# Patient Record
Sex: MALE | Race: ASIAN | NOT HISPANIC OR LATINO | Employment: FULL TIME | URBAN - NONMETROPOLITAN AREA
[De-identification: names, ages, dates, MRNs, and addresses within clinical notes are randomized per-mention and may not be internally consistent; named-entity substitution may affect disease eponyms.]

---

## 2020-01-04 ENCOUNTER — OFFICE VISIT (OUTPATIENT)
Dept: URGENT CARE | Facility: PHYSICIAN GROUP | Age: 45
End: 2020-01-04
Payer: COMMERCIAL

## 2020-01-04 ENCOUNTER — OCCUPATIONAL MEDICINE (OUTPATIENT)
Dept: URGENT CARE | Facility: PHYSICIAN GROUP | Age: 45
End: 2020-01-04
Payer: COMMERCIAL

## 2020-01-04 VITALS
WEIGHT: 202 LBS | SYSTOLIC BLOOD PRESSURE: 120 MMHG | HEART RATE: 74 BPM | DIASTOLIC BLOOD PRESSURE: 82 MMHG | RESPIRATION RATE: 16 BRPM | HEIGHT: 72 IN | TEMPERATURE: 98.2 F | BODY MASS INDEX: 27.36 KG/M2 | OXYGEN SATURATION: 97 %

## 2020-01-04 DIAGNOSIS — L60.1 NAIL PLATE SEPARATION: ICD-10-CM

## 2020-01-04 DIAGNOSIS — Z00.00 VISIT FOR ANNUAL HEALTH EXAMINATION: ICD-10-CM

## 2020-01-04 PROCEDURE — 99213 OFFICE O/P EST LOW 20 MIN: CPT | Performed by: FAMILY MEDICINE

## 2020-01-04 NOTE — PROGRESS NOTES
Subjective:      Soco Swan is a 44 y.o. male who presents with No chief complaint on file.      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         Objective:     There were no vitals taken for this visit.     Physical Exam  Constitutional:       Appearance: Normal appearance.   HENT:      Head: Normocephalic and atraumatic.      Nose: Nose normal.      Mouth/Throat:      Mouth: Mucous membranes are moist.   Eyes:      Pupils: Pupils are equal, round, and reactive to light.   Neck:      Musculoskeletal: Normal range of motion.   Cardiovascular:      Pulses: Normal pulses.   Pulmonary:      Effort: Pulmonary effort is normal.   Musculoskeletal:        Hands:    Neurological:      Mental Status: He is alert.         Nail still attached to the index finger, there is a small separation in the front, there is no bleeding or discharge, patient is able to move the fingers, and there is no neurovascular issues       Assessment/Plan:     1. Nail plate separation       -I will have the patient wrapped the nail with a nonstick gauze, patient can go back to normal job,   If there is any issue including fever pain discharge to come back to the clinic

## 2020-01-04 NOTE — LETTER
[address] [place] [address]  Phone:  [phone] - Fax:  [phone]   [facility] Progress Report and Disability Certification  Date of Service: 1/4/2020   No Show:  No  Date / Time of Next Visit:     Claim Information   Patient Name: Soco Swan  Claim Number:     Employer: [employer]  Date of Injury: 1/4/2020     Insurer / TPA: [employer]  ID / SSN:     Occupation: Maintenance  Diagnosis: The encounter diagnosis was Nail plate separation.    Medical Information   Related to Industrial Injury? No    Subjective Complaints:  Patient is a 44-year-old male is here complaining of right index finger nail that was lifted after he was fixing a glue machine.patient is stated that he had some bleeding, however the nail was not  from the finger, pain is minimal   Objective Findings: Nail still attached to the index finger, there is a small separation in the front, there is no bleeding or discharge, patient is able to move the fingers, and there is no neurovascular issues   Pre-Existing Condition(s): None   Assessment:   Initial Visit    Status:    Permanent Disability:No    Plan:   Comments:Patient can come back to the normal activity    Diagnostics: Other (see comment)    Comments:       Disability Information   Status: Released to Full Duty    From:     Through:   Restrictions are:     Physical Restrictions   Sitting:    Standing:    Stooping:    Bending:      Squatting:    Walking:    Climbing:    Pushing:      Pulling:    Other:    Reaching Above Shoulder (L):   Reaching Above Shoulder (R):       Reaching Below Shoulder (L):    Reaching Below Shoulder (R):      Not to exceed Weight Limits   Carrying(hrs):   Weight Limit(lb):   Lifting(hrs):   Weight  Limit(lb):     Comments: Patient is able to go back to the normal activity    Repetitive Actions   Hands: i.e. Fine Manipulations from Grasping:     Feet: i.e. Operating Foot  Controls:     Driving / Operate Machinery:     Physician Name: Angel Gomez M.D. Physician Signature:   e-Signature: Dr. Dong Sanchez, Medical Director   Clinic Name / Location: [address] [address] Clinic Phone Number: Dept: [phone]   Appointment Time: 3:25 Pm Visit Start Time: 3:26 PM   Check-In Time:  3:23 Pm Visit Discharge Time:  3:40 PM   Original-Treating Physician or Chiropractor    Page 2-Insurer/TPA    Page 3-Employer    Page 4-Employee

## 2020-01-04 NOTE — PATIENT INSTRUCTIONS
Finger Sprain  A finger sprain is an injury to one of the strong bands of tissue (ligaments) that connect the bones in the finger. The ligament can be stretched too much, or it can tear. A tear can be either partial or complete. The severity of the sprain depends on how much of the ligament was damaged or torn.  CAUSES  This injury is often caused by a fall or an accident. For example, if you extend your hands to catch an object or to protect yourself during a fall, the force of impact may cause the ligaments in your finger to stretch too much.  RISK FACTORS  The following factors may make you more likely to have this injury:  · Playing sports that involve a greater risk of falling, such as skiing.  · Playing sports that involve catching an object, such as basketball.  · Having poor strength and flexibility.  SYMPTOMS  Symptoms of this condition include:  · Pain at the affected finger joint, especially when bending or extending the finger.  · Loss of motion in the finger.  · Swelling.  · Tenderness.  · Bruising.  DIAGNOSIS  This condition is diagnosed with a medical history and physical exam. You may also have an X-ray of your finger to rule out a fracture or dislocation.  TREATMENT  Treatment varies depending on the severity of the sprain. If your ligament is overstretched or partially torn, treatment usually involves:  · Keeping the finger in a fixed position (immobilization) for a period of time. To help you do this, your health care provider may apply a bandage, splint, or cast to keep the finger from moving until it heals. In some cases, the finger may be taped to the fingers beside it (jamaal taping).  · Taking medicines for pain.  · Doing exercises for the finger after it has begun to heal.  If your ligament is fully torn, you may need surgery to reconnect the ligament to the bone. After surgery, a cast or splint will be applied.  HOME CARE INSTRUCTIONS  If You Have a Splint:   · Wear the splint as told by  your health care provider. Remove it only as told by your health care provider.  · Loosen the splint if your fingers tingle, become numb, or turn cold and blue.  · Do not let your splint get wet if it is not waterproof.  · Keep the splint clean.  If You Have a Cast:   · Do not stick anything inside the cast to scratch your skin. Doing that increases your risk of infection.  · Check the skin around the cast every day. Tell your health care provider about any concerns.  · You may put lotion on dry skin around the edges of the cast. Do not put lotion on the skin underneath the cast.  · Do not let your cast get wet if it is not waterproof.  · Keep the cast clean.  Bathing   · If your splint or cast is not waterproof, cover it with a watertight plastic bag when you take a bath or a shower.  · Keep any bandages (dressings) dry until your health care provider says they can be removed.  Managing Pain, Stiffness, and Swelling   · If directed, put ice on the injured area:  ¨ Put ice in a plastic bag.  ¨ Place a towel between your skin and the bag.  ¨ Leave the ice on for 20 minutes, 2-3 times a day.  · Move your fingers often to avoid stiffness and to lessen swelling.  · Raise (elevate) the injured area above the level of your heart while you are sitting or lying down.  General Instructions   · Do not put pressure on any part of the cast or splint until it is fully hardened. This may take several hours.  · Take over-the-counter and prescription medicines only as told by your health care provider.  · Do not drive or operate heavy machinery while taking prescription pain medicine.  · Do exercises as told by your health care provider or physical therapist.  · Do not wear rings on your injured finger.  · Keep all follow-up visits as told by your health care provider. This is important.  SEEK MEDICAL CARE IF:  · Your pain is not controlled with medicine.  · Your bruising or swelling gets worse.  · Your cast or splint is  damaged.  · Your finger is numb or blue.  · Your finger feels colder than normal.  This information is not intended to replace advice given to you by your health care provider. Make sure you discuss any questions you have with your health care provider.  Document Released: 01/25/2006 Document Revised: 04/10/2017 Document Reviewed: 10/27/2016  [employer] Interactive Patient Education © 2017 [employer]

## 2020-01-04 NOTE — LETTER
EMPLOYEE’S CLAIM FOR COMPENSATION/ REPORT OF INITIAL TREATMENT  FORM C-4    EMPLOYEE’S CLAIM - PROVIDE ALL INFORMATION REQUESTED   First Name  Soco Last Name  Sosa Birthdate                    1975                Sex  male Claim Number   Home Address  [address] Age  44 y.o. 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    How did this injury or occupational disease occur? (Be specific an answer in detail. Use additional sheet if necessary)  A tool i was using was so sharp   If you believe that you have an occupational disease, when did you first have knowledge of the disability and it relationship to your employment?  N/A Witnesses to the Accident  Moni Joseph      Nature of Injury or Occupational Disease  Puncture  Part(s) of Body Injured or Affected  Finger (R), ,     I certify that the above is true and correct to the best of my knowledge and that I have provided this information in order to obtain the benefits of [place]s [place] inclusive or Chapter  617 of [facility]).  I hereby authorize any physician, chiropractor, surgeon, practitioner, or other person, any hospital, including [facility] or [facility] hospital, any medical service organization, any insurance company, or other institution or organization to release to each other, any medical or other information, including benefits paid or payable, pertinent to this injury or disease, except information relative to diagnosis, treatment and/or counseling for AIDS, psychological conditions, alcohol or controlled substances, for which I must give specific authorization.  A Photostat of this authorization shall be as valid as the original.     Date: 1/4/2020   Place: [facility] [place] [facility]   Employee’s Signature   THIS REPORT MUST BE COMPLETED AND MAILED WITHIN 3 WORKING DAYS OF TREATMENT   Place  [facility]  Name of Facility  [facility]   Date  1/4/2020 Diagnosis  (L60.1) Nail plate separation Is there evidence the injured employee was under the influence of alcohol and/or another controlled substance at the time of accident?   Hour  3:26 PM Description of Injury or Disease  The encounter diagnosis was Nail plate separation. No   Treatment  We will have the finger wrapped with a nonstick gauze.   Have you advised the patient to remain off work five days or more? No   X-Ray Findings      If Yes   From Date  To Date      From information given by the employee, together with medical evidence, can you directly connect this injury or occupational disease as job incurred?  Yes If No Full Duty Yes Modified Duty  No   Is additional medical care by a physician indicated?  No If Modified Duty, Specify any Limitations / Restrictions      Do you know of any previous injury or disease contributing to this condition or occupational disease?                            No   Date  1/4/2020 Print Doctor’s Name Angel Gomez M.D. I certify the employer’s copy of  this form was mailed on:   Address  [address]  [address] Insurer’s Use Only     [place] Zip  [address]    Provider’s Tax ID Number  [id number] Telephone  Dept: [phone]            e-Signature: Dr. Dong Sanchez,   Medical Director Degree  MD        ORIGINAL-TREATING PHYSICIAN OR CHIROPRACTOR    PAGE 2-INSURER/TPA    PAGE 3-EMPLOYER    PAGE 4-EMPLOYEE             Form C-4 (rev.10/07)              BRIEF DESCRIPTION OF RIGHTS AND BENEFITS  (Pursuant to [place])    Notice of Injury or Occupational Disease (Incident Report Form C-1): If an injury or occupational disease (OD) arises out of and in the course of employment, you must provide written notice to your employer as soon as practicable, but no later than 7 days after the accident or OD. Your employer shall maintain a sufficient supply of the required forms.    Claim for Compensation (Form C-4): If medical treatment is sought, the form C-4 is available at the place of initial treatment. A completed \"Claim for Compensation\" (Form C-4) must be filed within 90 days after an accident or OD. The treating physician or chiropractor must, within 3 working days after treatment, complete and mail to the employer, the employer's insurer and third-party , the Claim for Compensation.    Medical Treatment: If you require medical treatment for your on-the-job injury or OD, you may be required to select a physician or chiropractor from a list provided by your workers’ compensation insurer, if it has contracted with an Organization for Managed Care (MCO) or Preferred Provider Organization (PPO) or providers of health care. If your employer has not entered into a contract with an MCO or PPO, you may select a physician or chiropractor from the Panel of Physicians and Chiropractors. Any medical costs related to your industrial injury or OD will be paid by your insurer.    Temporary Total Disability (TTD): If your doctor has certified that you are unable to work for a period of at least 5 " consecutive days, or 5 cumulative days in a 20-day period, or places restrictions on you that your employer does not accommodate, you may be entitled to TTD compensation.    Temporary Partial Disability (TPD): If the wage you receive upon reemployment is less than the compensation for TTD to which you are entitled, the insurer may be required to pay you TPD compensation to make up the difference. TPD can only be paid for a maximum of 24 months.    Permanent Partial Disability (PPD): When your medical condition is stable and there is an indication of a PPD as a result of your injury or OD, within 30 days, your insurer must arrange for an evaluation by a rating physician or chiropractor to determine the degree of your PPD. The amount of your PPD award depends on the date of injury, the results of the PPD evaluation and your age and wage.    Permanent Total Disability (PTD): If you are medically certified by a treating physician or chiropractor as permanently and totally disabled and have been granted a PTD status by your insurer, you are entitled to receive monthly benefits not to exceed 66 2/3% of your average monthly wage. The amount of your PTD payments is subject to reduction if you previously received a PPD award.    Vocational Rehabilitation Services: You may be eligible for vocational rehabilitation services if you are unable to return to the job due to a permanent physical impairment or permanent restrictions as a result of your injury or occupational disease.    Transportation and Per Jassi Reimbursement: You may be eligible for travel expenses and per jassi associated with medical treatment.    Reopening: You may be able to reopen your claim if your condition worsens after claim closure.    Appeal Process: If you disagree with a written determination issued by the insurer or the insurer does not respond to your request, you may appeal to the Department of Administration, , by following the  instructions contained in your determination letter. You must appeal the determination within 70 days from the date of the determination letter at [address], [address], [place], [place] [address], or [address], [address], [place], [place] [address]. If you disagree with the  decision, you may appeal to the Department of Administration, . You must file your appeal within 30 days from the date of the  decision letter at [address], [address], [place], [place] [address], or [address], [address], [place], [place] [address]. If you disagree with a decision of an , you may file a petition for judicial review with the District Court. You must do so within 30 days of the Appeal Officer’s decision. You may be represented by an  at your own expense or you may contact the [facility] for possible representation.    [place]  for Injured Workers (NAIW): If you disagree with a  decision, you may request that NAIW represent you without charge at an  Hearing. For information regarding denial of benefits, you may contact the [facility] at: [address], [address], [place] [address], [phone], or [address], [address], [place] [address], [phone]    To File a Complaint with the Division: If you wish to file a complaint with the  of the Division of Industrial Relations (DIR),  please contact the Workers’ Compensation Section, [address], [address], [place], [place] [address], telephone [phone]) [phone], or [address], [address], [place], [place] [address], telephone [phone].    For assistance with Workers’ Compensation Issues: You may contact the Office of the Governor Consumer Health Assistance, [address], [address], [place], [place] [address], Toll Free 1-655.666.5584, Web site: http://govProBinder.[facility].[place]., E-mail  joan@sheyla..[place].us                   __________________________________________________________________                                                     ___1/4/2020______        Employee Name / Signature                                                                                                                                              Date                                                                                                                                                                                                     D-2 (rev. 06/18)

## 2020-01-07 ENCOUNTER — HOSPITAL ENCOUNTER (EMERGENCY)
Facility: MEDICAL CENTER | Age: 45
End: 2020-01-07
Attending: EMERGENCY MEDICINE
Payer: COMMERCIAL

## 2020-01-07 ENCOUNTER — NON-PROVIDER VISIT (OUTPATIENT)
Dept: OCCUPATIONAL MEDICINE | Facility: CLINIC | Age: 45
End: 2020-01-07
Payer: COMMERCIAL

## 2020-01-07 ENCOUNTER — APPOINTMENT (OUTPATIENT)
Dept: RADIOLOGY | Facility: MEDICAL CENTER | Age: 45
End: 2020-01-07
Attending: STUDENT IN AN ORGANIZED HEALTH CARE EDUCATION/TRAINING PROGRAM
Payer: COMMERCIAL

## 2020-01-07 VITALS
OXYGEN SATURATION: 97 % | WEIGHT: 201.28 LBS | TEMPERATURE: 97.5 F | HEART RATE: 75 BPM | DIASTOLIC BLOOD PRESSURE: 96 MMHG | HEIGHT: 70 IN | BODY MASS INDEX: 28.82 KG/M2 | RESPIRATION RATE: 16 BRPM | SYSTOLIC BLOOD PRESSURE: 150 MMHG

## 2020-01-07 DIAGNOSIS — Z02.83 ENCOUNTER FOR DRUG SCREENING: ICD-10-CM

## 2020-01-07 DIAGNOSIS — S01.81XA FACIAL LACERATION, INITIAL ENCOUNTER: ICD-10-CM

## 2020-01-07 LAB
AMP AMPHETAMINE: NORMAL
BAR BARBITURATES: NORMAL
BREATH ALCOHOL COMMENT: NORMAL
BZO BENZODIAZEPINES: NORMAL
COC COCAINE: NORMAL
INT CON NEG: NORMAL
INT CON POS: NORMAL
MDMA ECSTASY: NORMAL
MET METHAMPHETAMINES: NORMAL
MTD METHADONE: NORMAL
OPI OPIATES: NORMAL
OXY OXYCODONE: NORMAL
PCP PHENCYCLIDINE: NORMAL
POC BREATHALIZER: 0 PERCENT (ref 0–0.01)
POC URINE DRUG SCREEN OCDRS: NORMAL
THC: NORMAL

## 2020-01-07 PROCEDURE — 82075 ASSAY OF BREATH ETHANOL: CPT | Performed by: PREVENTIVE MEDICINE

## 2020-01-07 PROCEDURE — 304217 HCHG IRRIGATION SYSTEM

## 2020-01-07 PROCEDURE — 304999 HCHG REPAIR-SIMPLE/INTERMED LEVEL 1

## 2020-01-07 PROCEDURE — 70486 CT MAXILLOFACIAL W/O DYE: CPT

## 2020-01-07 PROCEDURE — 303747 HCHG EXTRA SUTURE

## 2020-01-07 PROCEDURE — 700111 HCHG RX REV CODE 636 W/ 250 OVERRIDE (IP): Performed by: STUDENT IN AN ORGANIZED HEALTH CARE EDUCATION/TRAINING PROGRAM

## 2020-01-07 PROCEDURE — 90715 TDAP VACCINE 7 YRS/> IM: CPT | Performed by: STUDENT IN AN ORGANIZED HEALTH CARE EDUCATION/TRAINING PROGRAM

## 2020-01-07 PROCEDURE — 80305 DRUG TEST PRSMV DIR OPT OBS: CPT | Performed by: PREVENTIVE MEDICINE

## 2020-01-07 PROCEDURE — 700101 HCHG RX REV CODE 250: Performed by: STUDENT IN AN ORGANIZED HEALTH CARE EDUCATION/TRAINING PROGRAM

## 2020-01-07 PROCEDURE — 90471 IMMUNIZATION ADMIN: CPT

## 2020-01-07 PROCEDURE — 99283 EMERGENCY DEPT VISIT LOW MDM: CPT

## 2020-01-07 RX ORDER — LIDOCAINE HYDROCHLORIDE AND EPINEPHRINE 10; 10 MG/ML; UG/ML
5 INJECTION, SOLUTION INFILTRATION; PERINEURAL ONCE
Status: COMPLETED | OUTPATIENT
Start: 2020-01-07 | End: 2020-01-07

## 2020-01-07 RX ADMIN — CLOSTRIDIUM TETANI TOXOID ANTIGEN (FORMALDEHYDE INACTIVATED), CORYNEBACTERIUM DIPHTHERIAE TOXOID ANTIGEN (FORMALDEHYDE INACTIVATED), BORDETELLA PERTUSSIS TOXOID ANTIGEN (GLUTARALDEHYDE INACTIVATED), BORDETELLA PERTUSSIS FILAMENTOUS HEMAGGLUTININ ANTIGEN (FORMALDEHYDE INACTIVATED), BORDETELLA PERTUSSIS PERTACTIN ANTIGEN, AND BORDETELLA PERTUSSIS FIMBRIAE 2/3 ANTIGEN 0.5 ML: 5; 2; 2.5; 5; 3; 5 INJECTION, SUSPENSION INTRAMUSCULAR at 09:04

## 2020-01-07 RX ADMIN — LIDOCAINE HYDROCHLORIDE AND EPINEPHRINE 5 ML: 10; 10 INJECTION, SOLUTION INFILTRATION; PERINEURAL at 09:06

## 2020-01-07 SDOH — HEALTH STABILITY: MENTAL HEALTH: HOW OFTEN DO YOU HAVE A DRINK CONTAINING ALCOHOL?: NEVER

## 2020-01-07 ASSESSMENT — LIFESTYLE VARIABLES: DO YOU DRINK ALCOHOL: NO

## 2020-01-07 NOTE — ED PROVIDER NOTES
ED Provider Note    ER PROVIDER NOTE      Primary Care Provider: Pcp Pt States None  Means of Arrival: Walk-in  History obtained from: Patient, translated by his friend    CHIEF COMPLAINT  Chief Complaint   Patient presents with   • T-5000 Facial Trauma       HPI  Soco Swan is a 44 y.o. male who presents to the emergency department complaining of facial trauma which occurred today at work.  He reports that he was trying to fix of broken air cylinder when his teammates pressed the deploy but none the cylinder came down on the left side of his face.  He reports that the cylinder is relatively small about 1 inch in diameter, but came down with some force on the left side of his face.  He immediately experienced bleeding and pain.  He denies loss of consciousness, headache, dizziness, nausea, vomiting.  He does not recall when his last tetanus was.    REVIEW OF SYSTEMS  Pertinent positives include bleeding, pain. Pertinent negatives include no loss of consciousness, headache, dizziness, nausea, vomiting. See HPI for details. All other systems reviewed and are negative.    PAST MEDICAL HISTORY  Reports none    SURGICAL HISTORY  patient denies any surgical history    FAMILY HISTORY  History reviewed. No pertinent family history.    SOCIAL HISTORY  Social History     Socioeconomic History   • Marital status:      Spouse name: Not on file   • Number of children: Not on file   • Years of education: Not on file   • Highest education level: Not on file   Occupational History   • Not on file   Social Needs   • Financial resource strain: Not on file   • Food insecurity:     Worry: Not on file     Inability: Not on file   • Transportation needs:     Medical: Not on file     Non-medical: Not on file   Tobacco Use   • Smoking status: Never Smoker   Substance and Sexual Activity   • Alcohol use: Never     Frequency: Never   • Drug use: Never   • Sexual activity: Not on file   Lifestyle   • Physical activity:     Days per  "week: Not on file     Minutes per session: Not on file   • Stress: Not on file   Relationships   • Social connections:     Talks on phone: Not on file     Gets together: Not on file     Attends Confucianism service: Not on file     Active member of club or organization: Not on file     Attends meetings of clubs or organizations: Not on file     Relationship status: Not on file   • Intimate partner violence:     Fear of current or ex partner: Not on file     Emotionally abused: Not on file     Physically abused: Not on file     Forced sexual activity: Not on file   Other Topics Concern   • Not on file   Social History Narrative   • Not on file      Social History     Substance and Sexual Activity   Drug Use Never       CURRENT MEDICATIONS  Home Medications     Reviewed by Laura Goldberg R.N. (Registered Nurse) on 01/07/20 at 0813  Med List Status: Complete   Medication Last Dose Status        Patient Carlos Taking any Medications                       ALLERGIES  No Known Allergies    PHYSICAL EXAM  VITAL SIGNS: /108   Pulse 81   Temp 36.4 °C (97.5 °F) (Temporal)   Resp 16   Ht 1.77 m (5' 9.69\")   Wt 91.3 kg (201 lb 4.5 oz)   SpO2 97%   BMI 29.14 kg/m²   Pulse ox interpretation: I interpret this pulse ox as normal.    Constitutional: Alert in no apparent distress.  HENT: There is obvious trauma to the left side of the face anterior to the ear.  There is a flap laceration measuring approximately 3.5 cm in length.  The laceration does not involve the cartilage or deep tissue.  There is tenderness to palpation in this area bilateral external ears normal, Nose normal.   Eyes: Pupils are equal and reactive, Conjunctiva normal, Non-icteric.   Neck: Normal range of motion, No tenderness, Supple, No stridor.    Cardiovascular: Regular rate and rhythm, no murmurs.   Thorax & Lungs: Normal breath sounds, No respiratory distress, No wheezing  Abdomen: Bowel sounds normal, Soft, No tenderness. No peritoneal " signs.  Skin: Warm, Dry, No erythema, No rash.  See HENT findings  Back: No bony tenderness in the cervical spine.   Extremities: Intact distal pulses, No edema, No tenderness, No cyanosis  Musculoskeletal: Good range of motion in all major joints. No major deformities noted.   Neurologic: Alert, cranial nerves II through XII intact, normal motor function, Normal sensory function, No focal deficits noted.   Psychiatric: Affect normal, Judgment normal, Mood normal.           RADIOLOGY  CT-MAXILLOFACIAL W/O PLUS RECONS   Final Result         1. Subcutaneous air anterior to the left ear, suggestive of laceration.   2. No maxillofacial fractures.   3. Moderate to marked mucosal thickening of the right maxillary sinus, which may be due to sinusitis.        The radiologist's interpretation of all radiological studies have been reviewed by me.    COURSE & MEDICAL DECISION MAKING  Nursing notes, KODY CORRALHx reviewed in chart.    8:45 AM Patient seen and examined at bedside. Patient will be treated with laceration repair. Ordered for CT maxillofacial to evaluate his symptoms.     Laceration Repair Procedure    Indication: Laceration    Location/Description: Left face    Procedure: The patient was placed in the appropriate position and anesthesia around the laceration was obtained by infiltration using 3.5 cc of 1% Lidocaine with epinephrine. The area was then irrigated with normal saline. The laceration was closed with 4-0 Prolene using interrupted sutures. There were no additional lacerations requiring repair. The wound area was then dressed with a sterile dressing.      Total repaired wound length: 3.5 cm.     Other Items: None    The patient tolerated the procedure well.    Complications: None          Decision Making:  This is a 44 y.o. male who experienced trauma to the left side of the face at work today.  His laceration has been repaired in the usual sterile manner as described above.  He will go to CT for evaluation  of possible underlying fracture.  He will be given a tetanus shot to ensure immunization status is up-to-date.  He has no clinical signs of concussion or trauma to the brain.  His CT scan is negative for acute fracture.  Therefore he will be discharged home in a stable condition with instructions to follow-up in 5 days for suture removal.        FINAL IMPRESSION  1. Facial laceration, initial encounter          The patient will return for new or worsening symptoms and is stable at the time of discharge. Patient was given return precautions. Patient and/or family member verbalizes understanding and will comply.    DISPOSITION:  Patient will be discharged home in stable condition.    FOLLOW UP:  [address]  [address]  [place] [address]  [phone]  On 1/13/2020  For suture removal      OUTPATIENT MEDICATIONS:  New Prescriptions    No medications on file

## 2020-01-07 NOTE — LETTER
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 (if applicable) CHECKING A CYLINDER UNDER JIGS    How did this injury or occupational disease occur? Be specific and answer in detail. 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    If you believe that you have an occupational disease, when did you first have knowledge of the disability and it relationship to your employment? N/A Witnesses to the Accident  ANURADHA GASTELUM   Nature of Injury or Occupational Disease  Workers' Compensation Part(s) of Body Injured or Affected  Soft Tissue, Ear (L), N/A    I CERTIFY THAT THE ABOVE IS TRUE AND CORRECT TO THE BEST OF MY KNOWLEDGE AND THAT I HAVE PROVIDED THIS INFORMATION IN ORDER TO OBTAIN THE BENEFITS OF " [place]).  I HEREBY AUTHORIZE ANY PHYSICIAN, CHIROPRACTOR, SURGEON, PRACTITIONER, OR OTHER PERSON, ANY HOSPITAL, INCLUDING [facility] OR [facility], ANY MEDICAL SERVICE ORGANIZATION, ANY INSURANCE COMPANY, OR OTHER INSTITUTION OR ORGANIZATION TO RELEASE TO EACH OTHER, ANY MEDICAL OR OTHER INFORMATION, INCLUDING BENEFITS PAID OR PAYABLE, PERTINENT TO THIS INJURY OR DISEASE, EXCEPT INFORMATION RELATIVE TO DIAGNOSIS, TREATMENT AND/OR COUNSELING FOR AIDS, PSYCHOLOGICAL CONDITIONS, ALCOHOL OR CONTROLLED SUBSTANCES, FOR WHICH I MUST GIVE SPECIFIC AUTHORIZATION.  A PHOTOSTAT OF THIS AUTHORIZATION SHALL BE AS VALID AS THE ORIGINAL.  Date   01/07/2020                              Place       [facility]                                                      Employee’s Signature   THIS REPORT MUST BE COMPLETED AND MAILED WITHIN 3 WORKING DAYS OF TREATMENT   Place [facility], EMERGENCY DEPT                                                                             Name of Facility [facility]   Date  1/7/2020 Diagnosis  (S01.81XA) Facial laceration, initial encounter Is there evidence the injured employee was under the influence of alcohol and/or another controlled substance at the time of accident?   Hour  10:46 AM Description of Injury or Disease  Facial laceration, initial encounter No   Treatment  sutures  Have you advised the patient to remain off work five days or more?         No   X-Ray Findings  Negative If Yes   From Date    To Date      From information given by the employee, together with medical evidence, can you directly connect this injury or occupational disease as job incurred? Yes If No, is employee capable of: Full Duty  No Modified Duty  Yes   Is additional medical care by a physician indicated? Yes If Modified Duty, Specify any Limitations /  "Restrictions   Limited time on feet for 2 days   Do you know of any previous injury or disease contributing to this condition or occupational disease? No    Date 1/7/2020 Print Doctor’s Name Chase Ashby I certify the employer’s copy of this form was mailed on:   Address [address] [place] [address]  [place] [id number] [phone] INSURER’S USE ONLY   Provider’s Tax ID Number   Telephone Dept: [phone]    Doctor’s Signature e-CHASE Cardona M.D. Degree  M.D.      Form C-4 (rev.10/07)                                                                         BRIEF DESCRIPTION OF RIGHTS AND BENEFITS  (Pursuant to [place] 616C.050)    Notice of Injury or Occupational Disease (Incident Report Form C-1): If an injury or occupational disease (OD) arises out of and in the course of employment, you must provide written notice to your employer as soon as practicable, but no later than 7 days after the accident or OD. Your employer shall maintain a sufficient supply of the required forms.    Claim for Compensation (Form C-4): If medical treatment is sought, the form C-4 is available at the place of initial treatment. A completed \"Claim for Compensation\" (Form C-4) must be filed within 90 days after an accident or OD. The treating physician or chiropractor must, within 3 working days after treatment, complete and mail to the employer, the employer's insurer and third-party , the Claim for Compensation.    Medical Treatment: If you require medical treatment for your on-the-job injury or OD, you may be required to select a physician or chiropractor from a list provided by your workers’ compensation insurer, if it has contracted with an Organization for Managed Care (MCO) or Preferred Provider Organization (PPO) or providers of health care. If your employer has not entered into a contract with an MCO or PPO, you may select a physician or chiropractor from the Panel of Physicians and Chiropractors. Any medical costs " related to your industrial injury or OD will be paid by your insurer.    Temporary Total Disability (TTD): If your doctor has certified that you are unable to work for a period of at least 5 consecutive days, or 5 cumulative days in a 20-day period, or places restrictions on you that your employer does not accommodate, you may be entitled to TTD compensation.    Temporary Partial Disability (TPD): If the wage you receive upon reemployment is less than the compensation for TTD to which you are entitled, the insurer may be required to pay you TPD compensation to make up the difference. TPD can only be paid for a maximum of 24 months.    Permanent Partial Disability (PPD): When your medical condition is stable and there is an indication of a PPD as a result of your injury or OD, within 30 days, your insurer must arrange for an evaluation by a rating physician or chiropractor to determine the degree of your PPD. The amount of your PPD award depends on the date of injury, the results of the PPD evaluation and your age and wage.    Permanent Total Disability (PTD): If you are medically certified by a treating physician or chiropractor as permanently and totally disabled and have been granted a PTD status by your insurer, you are entitled to receive monthly benefits not to exceed 66 2/3% of your average monthly wage. The amount of your PTD payments is subject to reduction if you previously received a PPD award.    Vocational Rehabilitation Services: You may be eligible for vocational rehabilitation services if you are unable to return to the job due to a permanent physical impairment or permanent restrictions as a result of your injury or occupational disease.    Transportation and Per Jassi Reimbursement: You may be eligible for travel expenses and per jassi associated with medical treatment.    Reopening: You may be able to reopen your claim if your condition worsens after claim closure.     Appeal Process: If you disagree  with a written determination issued by the insurer or the insurer does not respond to your request, you may appeal to the Department of Administration, , by following the instructions contained in your determination letter. You must appeal the determination within 70 days from the date of the determination letter at [address], [address], [place], [place] [address], or [address], [address], [place], [place] [address]. If you disagree with the  decision, you may appeal to the Department of Administration, . You must file your appeal within 30 days from the date of the  decision letter at [address], [address], [place], [place] [address], or [address], [address], [place], [place] [address]. If you disagree with a decision of an , you may file a petition for judicial review with the District Court. You must do so within 30 days of the Appeal Officer’s decision. You may be represented by an  at your own expense or you may contact the [facility] for possible representation.    [place]  for Injured Workers (NAIW): If you disagree with a  decision, you may request that NAIW represent you without charge at an  Hearing. For information regarding denial of benefits, you may contact the [facility] at: [address], [address], [place] [address], [phone], or [address], [address], [place] [address], [phone]    To File a Complaint with the Division: If you wish to file a complaint with the  of the Division of Industrial Relations (DIR),  please contact the Workers’ Compensation Section, [address], [address], [place], [place] [address], telephone [phone], or [address], [place], [place] [address], telephone [phone].    For assistance with Workers’ Compensation Issues: You may contact the  Office of the Governor Consumer Health Assistance, [address] [place] [address], [address], [place] [address], Toll Free 1-425.758.9887, Web site: [email or web address]., E-mail [email or web address].  D-2 (rev. 06/18)              __________________________________________________________________                                    _________________            Employee Name / Signature                                                                                                                            Date

## 2020-01-07 NOTE — ED NOTES
Discharge instructions given and explained, including f/u and s/s of infection. Pt and pt's friend verbalized understanding, all questions answered.

## 2020-01-14 ENCOUNTER — OCCUPATIONAL MEDICINE (OUTPATIENT)
Dept: OCCUPATIONAL MEDICINE | Facility: CLINIC | Age: 45
End: 2020-01-14
Payer: COMMERCIAL

## 2020-01-14 VITALS
TEMPERATURE: 98.4 F | SYSTOLIC BLOOD PRESSURE: 126 MMHG | RESPIRATION RATE: 16 BRPM | WEIGHT: 197.2 LBS | HEART RATE: 85 BPM | BODY MASS INDEX: 28.23 KG/M2 | HEIGHT: 70 IN | OXYGEN SATURATION: 97 % | DIASTOLIC BLOOD PRESSURE: 78 MMHG

## 2020-01-14 DIAGNOSIS — S01.81XD FACIAL LACERATION, SUBSEQUENT ENCOUNTER: ICD-10-CM

## 2020-01-14 PROCEDURE — 99202 OFFICE O/P NEW SF 15 MIN: CPT | Performed by: PREVENTIVE MEDICINE

## 2020-01-14 NOTE — PROGRESS NOTES
"Subjective:      Soco Swan is a 44 y.o. male who presents with Follow-Up ([facility] New[employer]u DOI 1/7/20 Lt face laceration, rm 17)      DOI 1/7/2020: 43 yo male presents with facial laceration. He reports that he was trying to fix of broken air cylinder when his teammates pressed the deploy but none the cylinder came down on the left side of his face.  He reports that the cylinder is relatively small about 1 inch in diameter, but came down with some force on the left side of his face.  He was seen in the ER, CT maxillofacial was negative and his wound was repaired with sutures.  Patient denies any drainage, erythema or swelling at the wound.  Has been applying Aquaphor daily to the wound.  Notes a little bit of tenderness but otherwise feels okay.     HPI    ROS  ROS: All systems were reviewed on intake form, form was reviewed and signed. See scanned documents in media. Pertinent positives and negatives included in HPI.    PMH: No pertinent past medical history to this problem  MEDS: Medications were reviewed in Epic  ALLERGIES: No Known Allergies  SOCHX: Works as a  at [employer]  FH: No pertinent family history to this problem     Objective:     /78   Pulse 85   Temp 36.9 °C (98.4 °F)   Resp 16   Ht 1.77 m (5' 9.69\")   Wt 89.4 kg (197 lb 3.2 oz)   SpO2 97%   BMI 28.55 kg/m²      Physical Exam  Constitutional:       Appearance: Normal appearance.   Cardiovascular:      Rate and Rhythm: Normal rate.   Pulmonary:      Effort: Pulmonary effort is normal.   Skin:     General: Skin is warm and dry.   Neurological:      General: No focal deficit present.      Mental Status: He is alert and oriented to person, place, and time.   Psychiatric:         Mood and Affect: Mood normal.         Thought Content: Thought content normal.         Judgment: Judgment normal.         Face: Left sided 4 cm laceration with wound edges well approximated.  No erythema, swelling or drainage.  Minimal tenderness " in this area.    Sutures removed without complication       Assessment/Plan:       1. Facial laceration, subsequent encounter  Try to avoid rubbing the wound for the next week or so may apply Aquaphor and cocoa butter as needed to reduce scarring  Released from care  Full duty  Follow-up as needed

## 2020-01-14 NOTE — LETTER
[address],   [address] [place] [address]  Phone:  [phone] - Fax:  [phone]   Occupational Health [facility] Progress Report and Disability Certification  Date of Service: 1/14/2020   No Show:  No  Date / Time of Next Visit:  Release from care   Claim Information   Patient Name: Soco Swan  Claim Number:     Employer: [employer]  Date of Injury: 1/7/2020     Insurer / TPA: [facility]  ID / SSN:     Occupation: MAINTENANCE  Diagnosis: The encounter diagnosis was Facial laceration, subsequent encounter.    Medical Information   Related to Industrial Injury? Yes    Subjective Complaints:  DOI 1/7/2020: 45 yo male presents with facial laceration. He reports that he was trying to fix of broken air cylinder when his teammates pressed the deploy but none the cylinder came down on the left side of his face.  He reports that the cylinder is relatively small about 1 inch in diameter, but came down with some force on the left side of his face.  He was seen in the ER, CT maxillofacial was negative and his wound was repaired with sutures.  Patient denies any drainage, erythema or swelling at the wound.  Has been applying Aquaphor daily to the wound.  Notes a little bit of tenderness but otherwise feels okay.   Objective Findings: Face: Left sided 4 cm laceration with wound edges well approximated.  No erythema, swelling or drainage.  Minimal tenderness in this area.    Sutures removed without complication   Pre-Existing Condition(s):     Assessment:   Condition Improved    Status: Discharged /  MMI  Permanent Disability:No    Plan:      Diagnostics:      Comments:  Try to avoid rubbing the wound for the next week or so may apply Aquaphor and cocoa butter as needed to reduce scarring  Released from care  Full duty  Follow-up as needed    Disability Information   Status: Released to Full Duty    From:  1/14/2020  Through:   Restrictions are:     Physical  Restrictions   Sitting:    Standing:    Stooping:    Bending:      Squatting:    Walking:    Climbing:    Pushing:      Pulling:    Other:    Reaching Above Shoulder (L):   Reaching Above Shoulder (R):       Reaching Below Shoulder (L):    Reaching Below Shoulder (R):      Not to exceed Weight Limits   Carrying(hrs):   Weight Limit(lb):   Lifting(hrs):   Weight  Limit(lb):     Comments:      Repetitive Actions   Hands: i.e. Fine Manipulations from Grasping:     Feet: i.e. Operating Foot Controls:     Driving / Operate Machinery:     Physician Name: Olegario Reed D.O. Physician Signature: OLEGARIO Barlow D.O. e-Signature: Dr. Dong Sanchez, Medical Director   Clinic Name / Location: [address],   [address] [place] [address] Clinic Phone Number: Dept: [phone]   Appointment Time: 1:45 Pm Visit Start Time: 1:31 PM   Check-In Time:  1:08 Pm Visit Discharge Time: 1:57 pm    Original-Treating Physician or Chiropractor    Page 2-Insurer/TPA    Page 3-Employer    Page 4-Employee

## 2020-05-26 ENCOUNTER — OCCUPATIONAL MEDICINE (OUTPATIENT)
Dept: URGENT CARE | Facility: CLINIC | Age: 45
End: 2020-05-26
Payer: COMMERCIAL

## 2020-05-26 VITALS
HEART RATE: 88 BPM | HEIGHT: 69 IN | BODY MASS INDEX: 29.18 KG/M2 | DIASTOLIC BLOOD PRESSURE: 88 MMHG | SYSTOLIC BLOOD PRESSURE: 120 MMHG | WEIGHT: 197 LBS | TEMPERATURE: 98.3 F | OXYGEN SATURATION: 97 % | RESPIRATION RATE: 16 BRPM

## 2020-05-26 DIAGNOSIS — S61.211A LACERATION OF LEFT INDEX FINGER WITHOUT FOREIGN BODY WITHOUT DAMAGE TO NAIL, INITIAL ENCOUNTER: ICD-10-CM

## 2020-05-26 DIAGNOSIS — Z02.1 PRE-EMPLOYMENT DRUG SCREENING: ICD-10-CM

## 2020-05-26 LAB
AMP AMPHETAMINE: NORMAL
BREATH ALCOHOL COMMENT: NORMAL
COC COCAINE: NORMAL
INT CON NEG: NORMAL
INT CON POS: NORMAL
MET METHAMPHETAMINES: NORMAL
OPI OPIATES: NORMAL
PCP PHENCYCLIDINE: NORMAL
POC BREATHALIZER: 0 PERCENT (ref 0–0.01)
POC DRUG COMMENT 753798-OCCUPATIONAL HEALTH: NORMAL
THC: NORMAL

## 2020-05-26 PROCEDURE — 82075 ASSAY OF BREATH ETHANOL: CPT | Performed by: NURSE PRACTITIONER

## 2020-05-26 PROCEDURE — 12001 RPR S/N/AX/GEN/TRNK 2.5CM/<: CPT | Mod: 29 | Performed by: NURSE PRACTITIONER

## 2020-05-26 PROCEDURE — 80305 DRUG TEST PRSMV DIR OPT OBS: CPT | Performed by: NURSE PRACTITIONER

## 2020-05-26 ASSESSMENT — ENCOUNTER SYMPTOMS
CHILLS: 0
DIZZINESS: 0
FEVER: 0
BRUISES/BLEEDS EASILY: 0

## 2020-05-26 NOTE — LETTER
"EMPLOYEE’S CLAIM FOR COMPENSATION/ REPORT OF INITIAL TREATMENT  FORM C-4    EMPLOYEE’S CLAIM - PROVIDE ALL INFORMATION REQUESTED   First Name  Soco Last Name  Sosa Birthdate                    1975                Sex  male Claim Number   Home Address  [place] Age  45 y.o. 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 (if applicable)  Grinding a mold    How did this injury or occupational disease occur? (Be specific an answer in detail. Use additional sheet if necessary)  I was grinding a mold using a  and was wearing 2 gloves, but cutting disk got my finger cut.   If you believe that you have an occupational disease, when did you first have knowledge of the disability and it relationship to your employment?  N/A Witnesses to the Accident  N/A      Nature of Injury or Occupational Disease  Workers' Compensation  Part(s) of Body Injured or Affected  Finger (L), ,     I certify that the above is true and correct to the best of my knowledge and that I have provided this information in order to obtain the benefits of " [place]  I hereby authorize any physician, chiropractor, surgeon, practitioner, or other person, any hospital, including [facility] or [facility], any medical service organization, any insurance company, or other institution or organization to release to each other, any medical or other information, including benefits paid or payable, pertinent to this injury or disease, except information relative to diagnosis, treatment and/or counseling for AIDS, psychological conditions, alcohol or controlled substances, for which I must give specific authorization.  A Photostat of this authorization shall be as valid as the original.     Date   Place   Employee’s Signature   THIS REPORT MUST BE COMPLETED AND MAILED WITHIN 3 WORKING DAYS OF TREATMENT   Place  [facility]  Name of Facility  [facility]   Date  5/26/2020 Diagnosis  (S61.211A) Laceration of left index finger without foreign body without damage to nail, initial encounter Is there evidence the injured employee was under the influence of alcohol and/or another controlled substance at the time of accident?   Hour  8:57 AM Description of Injury or Disease  The encounter diagnosis was Laceration of left index finger without foreign body without damage to nail, initial encounter. No   Treatment  Laceration repair in clinic with sutures.  Keep wound clean and dry.  Cover with bandage for first 24 hours and anytime when at work.  OTC analgesics prn pain.  Have you advised the patient to remain off work five days or more? No   X-Ray Findings      If Yes   From Date  To Date      From information given by the employee, together with medical evidence, can you directly connect this injury or occupational disease as job incurred?  Yes If No Full Duty No Modified Duty  Yes   Is additional medical care by a physician  "indicated?  Yes  Comments:Follow up in 1 week, sooner if worse. If Modified Duty, Specify any Limitations / Restrictions  Use of left hand as tolerated.  Limit fine hand manipulatives with left hand.   Do you know of any previous injury or disease contributing to this condition or occupational disease?                            No   Date  5/26/2020 Print Doctor’s Name RICHARD Mendoza I certify the employer’s copy of  this form was mailed on:   Address  [address] [facility] [address] [address] Insurer’s Use Only     [facility] Zip  [address]    Provider’s Tax ID Number  [id number] Telephone  Dept: [phone]        e-AARON Castano   e-Signature: Dr. Dong Sanchez,   Medical Director Degree  MD PANG-TREATING PHYSICIAN OR CHIROPRACTOR    PAGE 2-INSURER/TPA    PAGE 3-EMPLOYER    PAGE 4-EMPLOYEE             Form C-4 (rev.10/07)              BRIEF DESCRIPTION OF RIGHTS AND BENEFITS  (Pursuant to [place] 616C.050)    Notice of Injury or Occupational Disease (Incident Report Form C-1): If an injury or occupational disease (OD) arises out of and in the course of employment, you must provide written notice to your employer as soon as practicable, but no later than 7 days after the accident or OD. Your employer shall maintain a sufficient supply of the required forms.    Claim for Compensation (Form C-4): If medical treatment is sought, the form C-4 is available at the place of initial treatment. A completed \"Claim for Compensation\" (Form C-4) must be filed within 90 days after an accident or OD. The treating physician or chiropractor must, within 3 working days after treatment, complete and mail to the employer, the employer's insurer and third-party , the Claim for Compensation.    Medical Treatment: If you require medical treatment for your on-the-job injury or OD, you may be required to select a physician or chiropractor from a list provided by your workers’ compensation " insurer, if it has contracted with an Organization for Managed Care (MCO) or Preferred Provider Organization (PPO) or providers of health care. If your employer has not entered into a contract with an MCO or PPO, you may select a physician or chiropractor from the Panel of Physicians and Chiropractors. Any medical costs related to your industrial injury or OD will be paid by your insurer.    Temporary Total Disability (TTD): If your doctor has certified that you are unable to work for a period of at least 5 consecutive days, or 5 cumulative days in a 20-day period, or places restrictions on you that your employer does not accommodate, you may be entitled to TTD compensation.    Temporary Partial Disability (TPD): If the wage you receive upon reemployment is less than the compensation for TTD to which you are entitled, the insurer may be required to pay you TPD compensation to make up the difference. TPD can only be paid for a maximum of 24 months.    Permanent Partial Disability (PPD): When your medical condition is stable and there is an indication of a PPD as a result of your injury or OD, within 30 days, your insurer must arrange for an evaluation by a rating physician or chiropractor to determine the degree of your PPD. The amount of your PPD award depends on the date of injury, the results of the PPD evaluation and your age and wage.    Permanent Total Disability (PTD): If you are medically certified by a treating physician or chiropractor as permanently and totally disabled and have been granted a PTD status by your insurer, you are entitled to receive monthly benefits not to exceed 66 2/3% of your average monthly wage. The amount of your PTD payments is subject to reduction if you previously received a PPD award.    Vocational Rehabilitation Services: You may be eligible for vocational rehabilitation services if you are unable to return to the job due to a permanent physical impairment or permanent  restrictions as a result of your injury or occupational disease.    Transportation and Per Jassi Reimbursement: You may be eligible for travel expenses and per jassi associated with medical treatment.    Reopening: You may be able to reopen your claim if your condition worsens after claim closure.    Appeal Process: If you disagree with a written determination issued by the insurer or the insurer does not respond to your request, you may appeal to the Department of Administration, , by following the instructions contained in your determination letter. You must appeal the determination within 70 days from the date of the determination letter at [address], [address], [place], [place] [address], or [address], [address], [place], [place] [address]. If you disagree with the  decision, you may appeal to the Department of Administration, . You must file your appeal within 30 days from the date of the  decision letter at [address], [address], [place], [place] [address], or [address], [place], [place] [address]. If you disagree with a decision of an , you may file a petition for judicial review with the District Court. You must do so within 30 days of the Appeal Officer’s decision. You may be represented by an  at your own expense or you may contact the [facility] for possible representation.    [place]  for Injured Workers (NAIW): If you disagree with a  decision, you may request that NAIW represent you without charge at an  Hearing. For information regarding denial of benefits, you may contact the [facility] at: [address], [address], [place] [address], [phone], or [address], [address], [place] [address], [phone]    To File a Complaint with the Division: If you wish to file a complaint with the  of the Division of  Industrial Relations (DIR),  please contact the Workers’ Compensation Section, [address], [address], [place], [place] [address], telephone [phone]) [phone], or [address], [address], [place], [place] [address], telephone [phone].    For assistance with Workers’ Compensation Issues: You may contact the Office of the [facility], [address] [place] [address], [address], [place], [place] [address], Toll Free 1-510.889.9135, Web site: [email or web address]., E-mail [email or web address]                   __________________________________________________________________                                                     _________        Employee Name / Signature                                                                                                                                              Date                                                                                                                                                                                                     D-2 (rev. 06/18)

## 2020-05-26 NOTE — PROGRESS NOTES
"Subjective:      Soco Swan is a 45 y.o. male who presents with Laceration (WC New, Laceration of LT index finger. )      DOI 5/26/2020.  1st visit.  Soco Swan is a 45 year old male.  He presents to urgent care today with a laceration to the distal tip of the left index finger.  He was at work this morning, using a .  He was wearing protective gloves, but the  slipped and went through the glove, causing a laceration.  He denies any suspected foreign body or damage to the nail.  First aid was administered at work and some sort of clotting powder was applied to the wound, which provided good hemostasis.  He rates pain 3/10.  He has not taken any analgesics to treat the pain.  He denies any numbness, tingling, or weakness.  He denies any baseline pain, limitation, or prior injury to this area of his body.  He is right hand dominant.  He speaks Sami and also minimal English.   Sami video translation services provided during the course of this encounter.  Tetanus vaccine is up to date.     HPI    Review of Systems   Constitutional: Negative for chills and fever.   Neurological: Negative for dizziness.   Endo/Heme/Allergies: Does not bruise/bleed easily.     Medications, Allergies, and current problem list reviewed today in Epic     Objective:     Blood Pressure 120/88   Pulse 88   Temperature 36.8 °C (98.3 °F) (Temporal)   Respiration 16   Height 1.753 m (5' 9\")   Weight 89.4 kg (197 lb)   Oxygen Saturation 97%   Body Mass Index 29.09 kg/m²      Physical Exam    Patient is alert, oriented, and in no acute distress.  Blood pressure 120/88.  Afebrile.  Heart RRR.  Left hand demonstrates a 2 cm laceration to the distal aspect of the left index finger along the medial edge and the fingertip pad.  Wound is obscured by a thick black clumped adherent material, presumably blood mixed with OTC clotting agent.  Wound soaked in clinic with hibiclens and warm water and gentle scrubbed with gauze to remove " debris.  Light bleeding noted.  Patient tolerated well.  Finger sensation, ROM and strength intact.  Nail is intact.  No evidence of retained foreign body.  Cap refill < 2 seconds.  Procedure: Laceration Repair  -Risks including bleeding, nerve damage, infection, and poor cosmetic outcome discussed. Benefits and alternatives discussed.   -Clean technique with sterile instruments and suture used  -Local anesthesia with 2% lidocaine  -Closed with a total of 5  4-0 Nylon interrupted sutures with good wound approximation  -Polysporin and dressing placed.  Minimal blood loss < 2 mL.  -Patient tolerated well       Assessment/Plan:       1. Laceration of left index finger without foreign body without damage to nail, initial encounter    Discussed wound care and work as-tolerated recommendations with patient.  Keep wound covered for first 24 hours.  After that time, he may shower per usual and clean wound prn with gentle soap and water.  Avoid use of any hydrogen peroxide, alcohol, or wound ointments or creams.  Cover with bandage when at work, otherwise leave open to air as tolerated after first 24 hours.   Follow up in 1 week, sooner if worse.    Patient verbalized understanding of and agreed with plan of care.

## 2020-05-26 NOTE — LETTER
[facility]  [address], [address] [place] [address]  Phone:  [phone] - Fax:  [phone]   [facility] Progress Report and Disability Certification  Date of Service: 5/26/2020   No Show:  No  Date / Time of Next Visit:  6/2/20 @ 10:00 AM [facility]   Claim Information   Patient Name: Soco Swan  Claim Number:     Employer: [employer]  Date of Injury: 5/26/2020     Insurer / TPA: [facility]  ID / SSN:     Occupation: Maintenence  Diagnosis: The encounter diagnosis was Laceration of left index finger without foreign body without damage to nail, initial encounter.    Medical Information   Related to Industrial Injury? Yes    Subjective Complaints:  DOI 5/26/2020.  1st visit.  Soco Swan is a 45 year old male.  He presents to urgent care today with a laceration to the distal tip of the left index finger.  He was at work this morning, using a .  He was wearing protective gloves, but the  slipped and went through the glove, causing a laceration.  He denies any suspected foreign body or damage to the nail.  First aid was administered at work and some sort of clotting powder was applied to the wound, which provided good hemostasis.  He rates pain 3/10.  He has not taken any analgesics to treat the pain.  He denies any numbness, tingling, or weakness.  He denies any baseline pain, limitation, or prior injury to this area of his body.  He is right hand dominant.  He speaks Kazakh and also minimal English.   Kazakh video translation services provided during the course of this encounter.  Tetanus vaccine is up to date.   Objective Findings: Patient is alert, oriented, and in no acute distress.  Blood pressure 120/88.  Afebrile.  Heart RRR.  Left hand demonstrates a 2 cm laceration to the distal aspect of the left index finger along the medial edge and the fingertip pad.  Wound is obscured by a thick black clumped adherent material,  presumably blood mixed with OTC clotting agent.  Wound soaked in clinic with hibiclens and warm water and gentle scrubbed with gauze to remove debris.  Light bleeding noted.  Patient tolerated well.  Finger sensation, ROM and strength intact.  Nail is intact.  No evidence of retained foreign body.  Cap refill < 2 seconds.  Procedure: Laceration Repair  -Risks including bleeding, nerve damage, infection, and poor cosmetic outcome discussed. Benefits and alternatives discussed.   -Clean technique with sterile instruments and suture used  -Local anesthesia with 2% lidocaine  -Closed with a total of 5  4-0 Nylon interrupted sutures with good wound approximation  -Polysporin and dressing placed.  Minimal blood loss < 2 mL.  -Patient tolerated well   Pre-Existing Condition(s):     Assessment:   Initial Visit    Status: Additional Care Required  Comments:Follow up in 1 week, sooner if worse.  Permanent Disability:No    Plan: Medication  Comments:OTC analgesics prn pain.    Diagnostics:      Comments:       Disability Information   Status: Released to Restricted Duty    From:     Through:   Restrictions are:     Physical Restrictions   Sitting:    Standing:    Stooping:    Bending:      Squatting:    Walking:    Climbing:    Pushing:      Pulling:    Other:    Reaching Above Shoulder (L):   Reaching Above Shoulder (R):       Reaching Below Shoulder (L):    Reaching Below Shoulder (R):      Not to exceed Weight Limits   Carrying(hrs):   Weight Limit(lb):   Lifting(hrs):   Weight  Limit(lb):     Comments: Use of left hand as tolerated.  Limit fine hand manipulatives with left hand.  Keep wound clean and dry.  Cover with bandage when at work, otherwise leave open to air as tolerated after first 24 hours.    Repetitive Actions   Hands: i.e. Fine Manipulations from Grasping:   Comments:Use of left hand as tolerated.  Limit fine hand manipulatives with left hand.   Feet: i.e. Operating Foot Controls:     Driving / Operate  Machinery:     Physician Name: RICHARD Mendoza Physician Signature: AARON Barron e-Signature: Dr. Dong Sanchez, Medical Director   Clinic Name / Location: [address], [address]  [facility] [place] [address] Clinic Phone Number: Dept: [phone]   Appointment Time: 8:45 Am Visit Start Time: 8:57 AM   Check-In Time:  8:52 Am Visit Discharge Time: 10:00 Am    Original-Treating Physician or Chiropractor    Page 2-Insurer/TPA    Page 3-Employer    Page 4-Employee

## 2020-05-26 NOTE — LETTER
"EMPLOYEE’S CLAIM FOR COMPENSATION/ REPORT OF INITIAL TREATMENT  FORM C-4    EMPLOYEE’S CLAIM - PROVIDE ALL INFORMATION REQUESTED   First Name  Soco Last Name  Sosa Birthdate                    1975                Sex  male Claim Number   Home Address  [place] Age  45 y.o. 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 (if applicable)  Grinding a mold    How did this injury or occupational disease occur? (Be specific an answer in detail. Use additional sheet if necessary)  I was grinding a mold using a  and was wearing 2 gloves, but cutting disk got my finger cut.   If you believe that you have an occupational disease, when did you first have knowledge of the disability and it relationship to your employment?  N/A Witnesses to the Accident  N/A      Nature of Injury or Occupational Disease  Workers' Compensation  Part(s) of Body Injured or Affected  Finger (L), ,     I certify that the above is true and correct to the best of my knowledge and that I have provided this information in order to obtain the benefits of " [place]  I hereby authorize any physician, chiropractor, surgeon, practitioner, or other person, any hospital, including [facility] or [facility], any medical service organization, any insurance company, or other institution or organization to release to each other, any medical or other information, including benefits paid or payable, pertinent to this injury or disease, except information relative to diagnosis, treatment and/or counseling for AIDS, psychological conditions, alcohol or controlled substances, for which I must give specific authorization.  A Photostat of this authorization shall be as valid as the original.     Date 5/26/20   Place [facility]   Employee’s Signature   THIS REPORT MUST BE COMPLETED AND MAILED WITHIN 3 WORKING DAYS OF TREATMENT   Place  [facility]  Name of Facility  [facility]   Date  5/26/2020 Diagnosis  (S61.211A) Laceration of left index finger without foreign body without damage to nail, initial encounter Is there evidence the injured employee was under the influence of alcohol and/or another controlled substance at the time of accident?   Hour  8:57 AM Description of Injury or Disease  The encounter diagnosis was Laceration of left index finger without foreign body without damage to nail, initial encounter. No   Treatment  Laceration repair in clinic with sutures.  Keep wound clean and dry.  Cover with bandage for first 24 hours and anytime when at work.  OTC analgesics prn pain.  Have you advised the patient to remain off work five days or more? No   X-Ray Findings      If Yes   From Date  To Date      From information given by the employee, together with medical evidence, can you directly connect this injury or occupational disease as job incurred?  Yes If No Full Duty No Modified Duty  Yes   Is additional medical care by a  "physician indicated?  Yes  Comments:Follow up in 1 week, sooner if worse. If Modified Duty, Specify any Limitations / Restrictions  Use of left hand as tolerated.  Limit fine hand manipulatives with left hand.   Do you know of any previous injury or disease contributing to this condition or occupational disease?                            No   Date  5/26/2020 Print Doctor’s Name RICHARD Mendoza I certify the employer’s copy of  this form was mailed on:   Address  [address], [facility] [address] Insurer’s Use Only     [facility] Zip  [address]    Provider’s Tax ID Number  [id number] Telephone  Dept: [phone]        e-AARON Castano   e-Signature: Dr. Dong Sanchez,   Medical Director Degree  MD PANG-TREATING PHYSICIAN OR CHIROPRACTOR    PAGE 2-INSURER/TPA    PAGE 3-EMPLOYER    PAGE 4-EMPLOYEE             Form C-4 (rev.10/07)              BRIEF DESCRIPTION OF RIGHTS AND BENEFITS  (Pursuant to [place]RS 616C.050)    Notice of Injury or Occupational Disease (Incident Report Form C-1): If an injury or occupational disease (OD) arises out of and in the course of employment, you must provide written notice to your employer as soon as practicable, but no later than 7 days after the accident or OD. Your employer shall maintain a sufficient supply of the required forms.    Claim for Compensation (Form C-4): If medical treatment is sought, the form C-4 is available at the place of initial treatment. A completed \"Claim for Compensation\" (Form C-4) must be filed within 90 days after an accident or OD. The treating physician or chiropractor must, within 3 working days after treatment, complete and mail to the employer, the employer's insurer and third-party , the Claim for Compensation.    Medical Treatment: If you require medical treatment for your on-the-job injury or OD, you may be required to select a physician or chiropractor from a list provided by your workers’ " compensation insurer, if it has contracted with an Organization for Managed Care (MCO) or Preferred Provider Organization (PPO) or providers of health care. If your employer has not entered into a contract with an MCO or PPO, you may select a physician or chiropractor from the Panel of Physicians and Chiropractors. Any medical costs related to your industrial injury or OD will be paid by your insurer.    Temporary Total Disability (TTD): If your doctor has certified that you are unable to work for a period of at least 5 consecutive days, or 5 cumulative days in a 20-day period, or places restrictions on you that your employer does not accommodate, you may be entitled to TTD compensation.    Temporary Partial Disability (TPD): If the wage you receive upon reemployment is less than the compensation for TTD to which you are entitled, the insurer may be required to pay you TPD compensation to make up the difference. TPD can only be paid for a maximum of 24 months.    Permanent Partial Disability (PPD): When your medical condition is stable and there is an indication of a PPD as a result of your injury or OD, within 30 days, your insurer must arrange for an evaluation by a rating physician or chiropractor to determine the degree of your PPD. The amount of your PPD award depends on the date of injury, the results of the PPD evaluation and your age and wage.    Permanent Total Disability (PTD): If you are medically certified by a treating physician or chiropractor as permanently and totally disabled and have been granted a PTD status by your insurer, you are entitled to receive monthly benefits not to exceed 66 2/3% of your average monthly wage. The amount of your PTD payments is subject to reduction if you previously received a PPD award.    Vocational Rehabilitation Services: You may be eligible for vocational rehabilitation services if you are unable to return to the job due to a permanent physical impairment or  permanent restrictions as a result of your injury or occupational disease.    Transportation and Per Jassi Reimbursement: You may be eligible for travel expenses and per jassi associated with medical treatment.    Reopening: You may be able to reopen your claim if your condition worsens after claim closure.    Appeal Process: If you disagree with a written determination issued by the insurer or the insurer does not respond to your request, you may appeal to the Department of Administration, , by following the instructions contained in your determination letter. You must appeal the determination within 70 days from the date of the determination letter at [address], [address], [place], [place] [address], or [address], [address], [place], [place] [address]. If you disagree with the  decision, you may appeal to the Department of Administration, . You must file your appeal within 30 days from the date of the  decision letter at [address], [address], [place], [place] [address], or [address], [address], [place], [place] [address]. If you disagree with a decision of an , you may file a petition for judicial review with the District Court. You must do so within 30 days of the Appeal Officer’s decision. You may be represented by an  at your own expense or you may contact the [facility] for possible representation.    [place]  for Injured Workers (NAIW): If you disagree with a  decision, you may request that NAIW represent you without charge at an  Hearing. For information regarding denial of benefits, you may contact the [facility] at: [address], [address] [address], [phone], or [address], [address], [place] [address], [phone]    To File a Complaint with the Division: If you wish to file a complaint with the  of the  Division of Industrial Relations (DIR),  please contact the Workers’ Compensation Section, [address], [address], [place], [place] [address], telephone ([phone]) [phone], or [address], [address], [place], [place] [address], telephone [phone]) [phone].    For assistance with Workers’ Compensation Issues: You may contact the Office of the [facility] Consumer Health Assistance, [address] [place] [address], [address], [place], [place] [address], Toll Free 1-957.449.5540, Web site: [email or web address]., E-mail [email or web address]                   __________________________________________________________________                                                     ______5/26/20___        Employee Name / Signature                                                                                                                                              Date                                                                                                                                                                                                     D-2 (rev. 06/18)

## 2020-06-02 ENCOUNTER — OCCUPATIONAL MEDICINE (OUTPATIENT)
Dept: URGENT CARE | Facility: PHYSICIAN GROUP | Age: 45
End: 2020-06-02
Payer: COMMERCIAL

## 2020-06-02 VITALS
RESPIRATION RATE: 16 BRPM | HEART RATE: 90 BPM | SYSTOLIC BLOOD PRESSURE: 150 MMHG | OXYGEN SATURATION: 99 % | TEMPERATURE: 98.1 F | DIASTOLIC BLOOD PRESSURE: 88 MMHG

## 2020-06-02 DIAGNOSIS — S61.211D LACERATION OF LEFT INDEX FINGER WITHOUT FOREIGN BODY WITHOUT DAMAGE TO NAIL, SUBSEQUENT ENCOUNTER: ICD-10-CM

## 2020-06-02 DIAGNOSIS — Z48.02 ENCOUNTER FOR REMOVAL OF SUTURES: ICD-10-CM

## 2020-06-02 DIAGNOSIS — R20.0 NUMBNESS OF FINGER: ICD-10-CM

## 2020-06-02 PROCEDURE — 99213 OFFICE O/P EST LOW 20 MIN: CPT | Performed by: FAMILY MEDICINE

## 2020-06-02 RX ORDER — IBUPROFEN 200 MG
200 TABLET ORAL EVERY 6 HOURS PRN
COMMUNITY

## 2020-06-02 NOTE — LETTER
[facility] [place] [address]  Phone:  [phone] - Fax:  [phone]   [facility] Progress Report and Disability Certification  Date of Service: 6/2/2020  No Show:  No  Date / Time of Next Visit: 6/9/2020   Claim Information   Patient Name: Soco Swan  Claim Number:     Employer: [employer]  Date of Injury: 5/26/2020     Insurer / TPA: [facility]  ID / SSN:     Occupation: Maintenence  Diagnosis: Diagnoses of Encounter for removal of sutures, Laceration of left index finger without foreign body without damage to nail, subsequent encounter, and Numbness of finger were pertinent to this visit.    Medical Information   Related to Industrial Injury? Yes    Subjective Complaints:  DOI: 5/26/2020. He had sutures placed to left finger laceration on 5/26/2020 and is here for follow-up. Has some numbness distal aspect of left index finger.   Objective Findings: Left index finger: wound present distal radial-volar aspect with 5 interrupted sutures in place. Wound is clean, dry, and intact.   Pre-Existing Condition(s):     Assessment:   Condition Improved    Status: Additional Care Required  Comments:Return on 6/9/2020 or sooner if needed.  Permanent Disability:No    Plan:      Diagnostics:      Comments:  All sutures removed with complication. Antibiotic ointment and band aid applied. He will continue with antibiotic ointment to wound and at least daily bandage changes. Advised numbness in left index finger can improve, but can take many weeks.    Disability Information   Status: Released to Restricted Duty    From:  6/2/2020  Through: 6/9/2020 Restrictions are: Temporary   Physical Restrictions   Sitting:    Standing:    Stooping:    Bending:      Squatting:    Walking:    Climbing:    Pushing:      Pulling:    Other:    Reaching Above Shoulder (L):   Reaching Above Shoulder (R):       Reaching Below Shoulder (L):    Reaching Below  Shoulder (R):      Not to exceed Weight Limits   Carrying(hrs):   Weight Limit(lb):   Lifting(hrs):   Weight  Limit(lb):     Comments: Use of left hand as tolerated. Limit fine hand manipulatives with left hand. Keep wound clean and dry. Cover with bandage when at work.    Repetitive Actions   Hands: i.e. Fine Manipulations from Grasping:   Comments:Use of left hand as tolerated. Limit fine hand manipulatives with left hand.   Feet: i.e. Operating Foot Controls:     Driving / Operate Machinery:     Physician Name: Woody Law M.D. Physician Signature: WOODY Montes De Oca M.D. e-Signature: Dr. Dong Sanchez, Medical Director   Clinic Name / Location: [address] [address] Clinic Phone Number: Dept: [phone]   Appointment Time: 10:00 Am Visit Start Time: 10:02 AM   Check-In Time:  9:44 Am Visit Discharge Time:  10:50 AM   Original-Treating Physician or Chiropractor    Page 2-Insurer/TPA    Page 3-Employer    Page 4-Employee

## 2020-06-02 NOTE — PROGRESS NOTES
Chief Complaint:    Chief Complaint   Patient presents with   • Follow-Up     WC fv-feeling overall better-no pain        History of Present Illness:    Visit done with Language [employer] translation service.    DOI: 5/26/2020. He had sutures placed to left index finger laceration on 5/26/2020 and is here for follow-up. Has some numbness distal aspect of left index finger.      Review of Systems:    Constitutional: Negative for fever, chills, and diaphoresis.   Eyes: Negative for change in vision, photophobia, pain, redness, and discharge.  ENT: Negative for ear pain, ear discharge, hearing loss, tinnitus, nasal congestion, nosebleeds, and sore throat.    Respiratory: Negative for shortness of breath.    Cardiovascular: Negative for chest pain.   Gastrointestinal: Negative for abdominal pain.   Genitourinary: Negative for dysuria.   Musculoskeletal: See HPI.   Skin: See HPI.   Neurological: See HPI.  Heme: Does not bruise/bleed easily.   Psychiatric/Behavioral: Negative for depression, suicidal ideas, hallucinations, memory loss and substance abuse.       Past Medical History:    No past medical history on file.    Past Surgical History:    No past surgical history on file.    Social History:    Social History     Socioeconomic History   • Marital status:      Spouse name: Not on file   • Number of children: Not on file   • Years of education: Not on file   • Highest education level: Not on file   Occupational History   • Not on file   Social Needs   • Financial resource strain: Not on file   • Food insecurity     Worry: Not on file     Inability: Not on file   • Transportation needs     Medical: Not on file     Non-medical: Not on file   Tobacco Use   • Smoking status: Never Smoker   • Smokeless tobacco: Never Used   Substance and Sexual Activity   • Alcohol use: Never     Frequency: Never   • Drug use: Never   • Sexual activity: Not on file   Lifestyle   • Physical activity     Days per week: Not on file      Minutes per session: Not on file   • Stress: Not on file   Relationships   • Social connections     Talks on phone: Not on file     Gets together: Not on file     Attends Temple service: Not on file     Active member of club or organization: Not on file     Attends meetings of clubs or organizations: Not on file     Relationship status: Not on file   • Intimate partner violence     Fear of current or ex partner: Not on file     Emotionally abused: Not on file     Physically abused: Not on file     Forced sexual activity: Not on file   Other Topics Concern   • Not on file   Social History Narrative   • Not on file     Family History:    No family history on file.    Medications:    Current Outpatient Medications on File Prior to Visit   Medication Sig Dispense Refill   • ibuprofen (MOTRIN) 200 MG Tab Take 200 mg by mouth every 6 hours as needed.       No current facility-administered medications on file prior to visit.      Allergies:    No Known Allergies      Vitals:    Vitals:    06/02/20 1008   BP: 150/88   Pulse: 90   Resp: 16   Temp: 36.7 °C (98.1 °F)   SpO2: 99%       Physical Exam:    Constitutional: Vital signs reviewed. Appears well-developed and well-nourished. No acute distress.   Eyes: Sclera white, conjunctivae clear.  ENT: External ears normal. Hearing normal.  Cardiovascular: Peripheral pulses 2+. Normal cap refill, < 2 seconds.  Pulmonary/Chest: Respirations non-labored.  Musculoskeletal: Normal gait. Normal range of motion. No muscular atrophy or weakness.  Neurological: Alert and oriented to person, place, and time. Muscle tone normal. Coordination normal. Light touch and sensation decreased distal aspect left index finger.  Skin: Left index finger: wound present distal radial-volar aspect with 5 interrupted sutures in place. Wound is clean, dry, and intact.  Psychiatric: Normal mood and affect. Behavior is normal. Judgment and thought content normal.       Assessment / Plan:    1. Encounter for  removal of sutures    2. Laceration of left index finger without foreign body without damage to nail, subsequent encounter    3. Numbness of finger      Discussed with him DDX, management options, and risks, benefits, and alternatives to treatment plan agreed upon.    Work restrictions: Use of left hand as tolerated. Limit fine hand manipulatives with left hand. Keep wound clean and dry. Cover with bandage when at work.     All sutures removed without complication. Antibiotic ointment and band aid applied. He will continue with antibiotic ointment to wound and at least daily bandage changes. Advised numbness in left index finger can improve, but can take many weeks.     Return on 6/9/2020 or sooner if needed.

## 2020-06-09 ENCOUNTER — OCCUPATIONAL MEDICINE (OUTPATIENT)
Dept: URGENT CARE | Facility: PHYSICIAN GROUP | Age: 45
End: 2020-06-09
Payer: COMMERCIAL

## 2020-06-09 VITALS
DIASTOLIC BLOOD PRESSURE: 88 MMHG | BODY MASS INDEX: 29.09 KG/M2 | SYSTOLIC BLOOD PRESSURE: 138 MMHG | OXYGEN SATURATION: 96 % | RESPIRATION RATE: 16 BRPM | WEIGHT: 203.2 LBS | HEART RATE: 106 BPM | HEIGHT: 70 IN | TEMPERATURE: 99.1 F

## 2020-06-09 DIAGNOSIS — S61.211D LACERATION OF LEFT INDEX FINGER WITHOUT FOREIGN BODY WITHOUT DAMAGE TO NAIL, SUBSEQUENT ENCOUNTER: ICD-10-CM

## 2020-06-09 PROCEDURE — 99213 OFFICE O/P EST LOW 20 MIN: CPT | Performed by: PHYSICIAN ASSISTANT

## 2020-06-09 ASSESSMENT — PAIN SCALES - GENERAL: PAINLEVEL: NO PAIN

## 2020-06-09 NOTE — LETTER
[address] [place] [address]  Phone:  [phone] - Fax:  [phone]   [facility] Progress Report and Disability Certification  Date of Service: 6/9/2020   No Show:  No  Date / Time of Next Visit:     Claim Information   Patient Name: Soco Swan  Claim Number:     Employer: [employer]  Date of Injury: 5/26/2020     Insurer / TPA: [facility]  ID / SSN:     Occupation: Maintenence  Diagnosis: The encounter diagnosis was Laceration of left index finger without foreign body without damage to nail, subsequent encounter.    Medical Information   Related to Industrial Injury? Yes    Subjective Complaints:  DOI: 5/26/2020, 3rd visit  LINDA: Laceration to the distal tip of the left index finger while using a  at work.  6/2/2020 follow-up, numbness remained at the distal aspect of left index finger but was pain-free.  All sutures removed.  Was instructed to keep covered at work and limit fine manipulations of the left hand. CURRENTLY: Continues to deny pain.  Continues to have numbness at the tip of his finger.  Has had no drainage.   Objective Findings: Left hand: Laceration on the left index finger healing appropriately.  No signs of infection noted.   Pre-Existing Condition(s):     Assessment:   Condition Improved    Status: Discharged /  MMI  Permanent Disability:No    Plan:      Diagnostics:      Comments:       Disability Information   Status: Released to Full Duty    From:  6/9/2020  Through:   Restrictions are:     Physical Restrictions   Sitting:    Standing:    Stooping:    Bending:      Squatting:    Walking:    Climbing:    Pushing:      Pulling:    Other:    Reaching Above Shoulder (L):   Reaching Above Shoulder (R):       Reaching Below Shoulder (L):    Reaching Below Shoulder (R):      Not to exceed Weight Limits   Carrying(hrs):   Weight Limit(lb):   Lifting(hrs):   Weight  Limit(lb):     Comments:         Repetitive Actions   Hands: i.e. Fine Manipulations from Grasping:     Feet: i.e. Operating Foot Controls:     Driving / Operate Machinery:     Physician Name: Omkar Coronado P.A.-C. Physician Signature: OMKAR Rosado P.A.-C. e-Signature: Dr. Dong Sanchez, Medical Director   Clinic Name / Location: [address] [address] Clinic Phone Number: Dept: [phone]   Appointment Time: 10:00 Am Visit Start Time: 10:19 AM   Check-In Time:  9:59 Am Visit Discharge Time:  10:47AM   Original-Treating Physician or Chiropractor    Page 2-Insurer/TPA    Page 3-Employer    Page 4-Employee

## 2020-06-09 NOTE — PROGRESS NOTES
"Chief Complaint   Patient presents with   • Follow-Up     follow up for workmans comp. Pt states that he has been following instructions and thinks he has gotten better.        HISTORY OF PRESENT ILLNESS: Patient is a 45 y.o. male who presents today for the following:    DOI: 5/26/2020, 3rd visit  LINDA: Laceration to the distal tip of the left index finger while using a  at work.  6/2/2020 follow-up, numbness remained at the distal aspect of left index finger but was pain-free.  All sutures removed.  Was instructed to keep covered at work and limit fine manipulations of the left hand. CURRENTLY: Continues to deny pain.  Continues to have numbness at the tip of his finger.  Has had no drainage.    Allergies:Patient has no known allergies.    PMH: No pertinent past medical history to this problem  MEDS: Medications were reviewed in Epic  ALLERGIES: Allergies were reviewed in Epic  SOCHX: Works at [employer]  FH: No pertinent family history to this problem    Review of Systems:    Constitutional ROS: No unexpected change in weight, No weakness, No fatigue  Musculoskeletal/Extremities ROS: Laceration left index finger, healing      Exam:  /88   Pulse (!) 106   Temp 37.3 °C (99.1 °F) (Temporal)   Resp 16   Ht 1.77 m (5' 9.69\")   Wt 92.2 kg (203 lb 3.2 oz)   SpO2 96%   General: Well developed, well nourished. No distress.  Pulmonary: Unlabored respiratory effort.    Left hand: Laceration on the left index finger healing appropriately.  No signs of infection noted.  Neurologic: Grossly nonfocal. No facial asymmetry noted.  Skin: Warm, dry, good turgor.    Psych: Normal mood. Alert and oriented x3. Judgment and insight is normal.    Assessment/Plan:  Laceration healing appropriately.  Return to work without restrictions.  Discharge/MMI.    Entire clinic visit was conducted using Amharic  #[id number].  1. Laceration of left index finger without foreign body without damage to nail, subsequent encounter   "

## 2020-06-17 PROBLEM — Z00.00 VISIT FOR ANNUAL HEALTH EXAMINATION: Status: ACTIVE | Noted: 2020-06-17
